# Patient Record
Sex: MALE | Race: WHITE | NOT HISPANIC OR LATINO | Employment: FULL TIME | URBAN - METROPOLITAN AREA
[De-identification: names, ages, dates, MRNs, and addresses within clinical notes are randomized per-mention and may not be internally consistent; named-entity substitution may affect disease eponyms.]

---

## 2022-08-24 ENCOUNTER — HOSPITAL ENCOUNTER (EMERGENCY)
Facility: HOSPITAL | Age: 37
Discharge: HOME | End: 2022-08-25
Attending: SURGERY

## 2022-08-24 VITALS
RESPIRATION RATE: 18 BRPM | TEMPERATURE: 98 F | SYSTOLIC BLOOD PRESSURE: 134 MMHG | DIASTOLIC BLOOD PRESSURE: 98 MMHG | WEIGHT: 207.13 LBS | HEART RATE: 108 BPM | OXYGEN SATURATION: 97 %

## 2022-08-24 DIAGNOSIS — S01.112A EYEBROW LACERATION, LEFT, INITIAL ENCOUNTER: Primary | ICD-10-CM

## 2022-08-24 PROCEDURE — 90715 TDAP VACCINE 7 YRS/> IM: CPT | Performed by: SURGERY

## 2022-08-24 PROCEDURE — 12013 RPR F/E/E/N/L/M 2.6-5.0 CM: CPT

## 2022-08-24 PROCEDURE — 90471 IMMUNIZATION ADMIN: CPT | Performed by: SURGERY

## 2022-08-24 PROCEDURE — 25000003 PHARM REV CODE 250: Performed by: SURGERY

## 2022-08-24 PROCEDURE — 63600175 PHARM REV CODE 636 W HCPCS: Performed by: SURGERY

## 2022-08-24 PROCEDURE — 99284 EMERGENCY DEPT VISIT MOD MDM: CPT

## 2022-08-24 RX ORDER — MUPIROCIN 20 MG/G
OINTMENT TOPICAL 3 TIMES DAILY
Qty: 15 G | Refills: 0 | Status: SHIPPED | OUTPATIENT
Start: 2022-08-24 | End: 2022-09-03

## 2022-08-24 RX ORDER — MUPIROCIN 20 MG/G
OINTMENT TOPICAL 3 TIMES DAILY
Qty: 15 G | Refills: 0 | Status: SHIPPED | OUTPATIENT
Start: 2022-08-24 | End: 2022-08-24 | Stop reason: SDUPTHER

## 2022-08-24 RX ORDER — LIDOCAINE HYDROCHLORIDE 10 MG/ML
5 INJECTION, SOLUTION EPIDURAL; INFILTRATION; INTRACAUDAL; PERINEURAL
Status: COMPLETED | OUTPATIENT
Start: 2022-08-24 | End: 2022-08-24

## 2022-08-24 RX ORDER — MUPIROCIN 20 MG/G
1 OINTMENT TOPICAL
Status: COMPLETED | OUTPATIENT
Start: 2022-08-24 | End: 2022-08-24

## 2022-08-24 RX ADMIN — LIDOCAINE HYDROCHLORIDE 50 MG: 10 INJECTION, SOLUTION EPIDURAL; INFILTRATION; INTRACAUDAL; PERINEURAL at 11:08

## 2022-08-24 RX ADMIN — TETANUS TOXOID, REDUCED DIPHTHERIA TOXOID AND ACELLULAR PERTUSSIS VACCINE, ADSORBED 0.5 ML: 5; 2.5; 8; 8; 2.5 SUSPENSION INTRAMUSCULAR at 11:08

## 2022-08-24 RX ADMIN — MUPIROCIN 22 G: 20 OINTMENT TOPICAL at 11:08

## 2022-08-25 NOTE — ED PROVIDER NOTES
Encounter Date: 8/24/2022       History     Chief Complaint   Patient presents with    Eye Injury     Patient to ER CC of a laceration to his left eye 30 minutes ago      37-year-old male presents with left eyebrow laceration today  Accidental injury at a local hotel, denies any LOC with injury  Patient declines any head CT or imaging, minimal bleeding   States that he needs a tetanus update and wound closure        Review of patient's allergies indicates:  No Known Allergies     History reviewed. No pertinent past medical history.  History reviewed. No pertinent surgical history.  History reviewed. No pertinent family history.     Review of Systems   Constitutional: Negative for activity change, appetite change, fatigue, fever and unexpected weight change.   HENT: Negative for congestion, ear pain, mouth sores, nosebleeds, rhinorrhea, sinus pressure, sneezing and sore throat.    Eyes: Negative for pain, discharge, redness and itching.   Respiratory: Negative for apnea, cough, chest tightness and shortness of breath.    Cardiovascular: Negative for chest pain, palpitations and leg swelling.   Gastrointestinal: Negative for abdominal distention, abdominal pain, anal bleeding, constipation, diarrhea, nausea and vomiting.   Endocrine: Negative.    Genitourinary: Negative for dysuria, enuresis, flank pain and frequency.   Musculoskeletal: Negative for arthralgias, back pain, neck pain and neck stiffness.   Skin: Positive for wound. Negative for color change.   Allergic/Immunologic: Negative.    Neurological: Negative for dizziness, tremors, syncope, facial asymmetry, speech difficulty, weakness, light-headedness, numbness and headaches.   Hematological: Negative for adenopathy. Does not bruise/bleed easily.   Psychiatric/Behavioral: Negative for agitation, behavioral problems, hallucinations, self-injury and suicidal ideas. The patient is not nervous/anxious.        Physical Exam     Initial Vitals [08/24/22 2327]   BP  Pulse Resp Temp SpO2   (!) 134/98 108 18 98.4 °F (36.9 °C) 97 %      MAP       --         Physical Exam    Constitutional: Vital signs are normal. He appears well-developed and well-nourished. He is cooperative.   HENT:   Head: Normocephalic.   Right Ear: Hearing, tympanic membrane, external ear and ear canal normal.   Left Ear: Hearing, tympanic membrane, external ear and ear canal normal.   Nose: Nose normal.   Mouth/Throat: Uvula is midline, oropharynx is clear and moist and mucous membranes are normal.   4 centimeter left eyebrow laceration   Eyes: Conjunctivae, EOM and lids are normal. Pupils are equal, round, and reactive to light.   Neck: Neck supple. No JVD present.   Normal range of motion.   Full passive range of motion without pain.     Cardiovascular: Normal rate, regular rhythm, S1 normal, S2 normal, normal heart sounds, intact distal pulses and normal pulses.   Pulmonary/Chest: Effort normal and breath sounds normal.   Abdominal: Abdomen is soft and flat. Bowel sounds are normal.   Musculoskeletal:         General: Normal range of motion.      Cervical back: Full passive range of motion without pain, normal range of motion and neck supple.     Neurological: He is alert and oriented to person, place, and time. He has normal strength.   Skin: Skin is warm, dry and intact. Capillary refill takes less than 2 seconds.         ED Course   Procedures  Laceration Repair  -- Performed by: BERTHA TRAN  -- Consent Done: Emergent Situation  -- Body area:  Left eyebrow  -- Laceration length: 4 centimeter  -- Tendon involvement: none  -- Nerve involvement: none  -- Vascular damage: no  -- Anesthesia: digital block  -- Local anesthetic: lidocaine 1%   -- Anesthetic total: 10 ml  -- Irrigation solution: saline  -- Amount of cleaning: standard  -- Skin closure: 4-0 nylon  -- Number of sutures: 5  -- Approximation difficulty: simple  -- Dressing: antibiotic ointment          Imaging Results    None           Medications   Tdap (BOOSTRIX) vaccine injection 0.5 mL (0.5 mLs Intramuscular Given 8/24/22 2345)   LIDOcaine (PF) 10 mg/ml (1%) injection 50 mg (50 mg Infiltration Given 8/24/22 2345)   mupirocin 2 % ointment 22 g (22 g Topical (Top) Given 8/24/22 2345)     Medical Decision Making:   Initial Assessment:   Left eyebrow laceration after an accidental injury tonight    Differential Diagnosis:   Differential diagnosis is left eyebrow laceration    ED Management:  Wound closure in the emergency room with good cosmetic effect  Tetanus status updated, cleaned and dressed with Bactroban  Clean 3 times a day with Bactroban afterwards, suture removal 7 days  Return to the ER with any concerning signs symptoms after DC                      Clinical Impression:   Final diagnoses:  [S01.112A] Eyebrow laceration, left, initial encounter (Primary)          ED Disposition Condition    Discharge Stable        ED Prescriptions     Medication Sig Dispense Start Date End Date Auth. Provider    mupirocin (BACTROBAN) 2 % ointment  (Status: Discontinued) Apply topically 3 (three) times daily. for 10 days 15 g 8/24/2022 8/24/2022 Kendell Ren MD    mupirocin (BACTROBAN) 2 % ointment Apply topically 3 (three) times daily. for 10 days 15 g 8/24/2022 9/3/2022 Kendell Ren MD        Follow-up Information     Follow up With Specialties Details Why Contact Info    Shavon Osorio MD Internal Medicine Go in 2 days  [address] [address]  [phone]             Kendell Ren MD  08/24/22 [id number]

## 2022-08-25 NOTE — ED NOTES
Left eye laceration cleansed with normal saline. Pat dry. Applied antibiotic ointment. Dressing applied to left eye. Pt educated on wound care and removal of sutures. Pt verbalized understanding